# Patient Record
Sex: MALE | Race: WHITE | Employment: FULL TIME | URBAN - METROPOLITAN AREA
[De-identification: names, ages, dates, MRNs, and addresses within clinical notes are randomized per-mention and may not be internally consistent; named-entity substitution may affect disease eponyms.]

---

## 2017-10-30 ENCOUNTER — HOSPITAL ENCOUNTER (EMERGENCY)
Age: 22
Discharge: HOME | End: 2017-10-30
Attending: EMERGENCY MEDICINE
Payer: OTHER MISCELLANEOUS

## 2017-10-30 VITALS
OXYGEN SATURATION: 99 % | RESPIRATION RATE: 16 BRPM | WEIGHT: 200 LBS | HEART RATE: 84 BPM | SYSTOLIC BLOOD PRESSURE: 124 MMHG | DIASTOLIC BLOOD PRESSURE: 70 MMHG | TEMPERATURE: 99 F

## 2017-10-30 DIAGNOSIS — S39.011A ABDOMINAL WALL STRAIN, INITIAL ENCOUNTER: Primary | ICD-10-CM

## 2017-10-30 PROCEDURE — 99282 EMERGENCY DEPT VISIT SF MDM: CPT

## 2017-10-31 NOTE — ED PROVIDER NOTES
Patient Seen in: [facility] Emergency Department In [place]    History   Patient presents with:  Abdomen/Flank Pain (GI/)    Stated Complaint: abd pain possible hernia    HPI    42-year-old male presents for evaluation of a possible hernia.   Patient was display    ============================================================  ED Course  ------------------------------------------------------------  MDM     Patient's abdomen is soft and nontender. There are no palpable hernias. He felt better knowing this.

## 2017-10-31 NOTE — ED INITIAL ASSESSMENT (HPI)
Pt in er for c/o pain to his lower abdomin post lifting an item, pt is concerned he may have a hernia

## 2018-09-19 ENCOUNTER — HOSPITAL ENCOUNTER (EMERGENCY)
Age: 23
Discharge: HOME | End: 2018-09-19
Attending: EMERGENCY MEDICINE
Payer: COMMERCIAL

## 2018-09-19 VITALS
SYSTOLIC BLOOD PRESSURE: 130 MMHG | TEMPERATURE: 98 F | OXYGEN SATURATION: 100 % | WEIGHT: 215 LBS | HEIGHT: 71 IN | RESPIRATION RATE: 18 BRPM | DIASTOLIC BLOOD PRESSURE: 72 MMHG | BODY MASS INDEX: 30.1 KG/M2 | HEART RATE: 74 BPM

## 2018-09-19 DIAGNOSIS — H57.12 PAIN OF LEFT EYE: Primary | ICD-10-CM

## 2018-09-19 PROCEDURE — 99284 EMERGENCY DEPT VISIT MOD MDM: CPT

## 2018-09-19 PROCEDURE — 99283 EMERGENCY DEPT VISIT LOW MDM: CPT

## 2018-09-19 NOTE — ED INITIAL ASSESSMENT (HPI)
Pt states he was walking out of the gym last night and rubbed his eye not sure if got something in it or scratched it but woke up w left eye swelling and redness.

## 2018-09-19 NOTE — ED PROVIDER NOTES
Patient Seen in: [facility] Emergency Department In [place]    History   Patient presents with:  Eyelid Swelling    Stated Complaint: left eye swelling    HPI    20year-old smoker who wears contacts presents for evaluation of left eye redness and pain. intact. Sclera are not icteric. Conjunctivae injected OS. Mucous members are moist.  Left eye was stained with fluorescein no uptake noted. Upper and lower lids were flipped no foreign body appreciated.   No appreciable foreign body on slit-lamp exam. for this patient.

## 2020-08-04 ENCOUNTER — HOSPITAL ENCOUNTER (EMERGENCY)
Age: 25
Discharge: HOME | End: 2020-08-04
Attending: EMERGENCY MEDICINE
Payer: COMMERCIAL

## 2020-08-04 VITALS
HEART RATE: 85 BPM | SYSTOLIC BLOOD PRESSURE: 125 MMHG | RESPIRATION RATE: 20 BRPM | TEMPERATURE: 99 F | DIASTOLIC BLOOD PRESSURE: 74 MMHG | HEIGHT: 69 IN | BODY MASS INDEX: 31.84 KG/M2 | OXYGEN SATURATION: 99 % | WEIGHT: 215 LBS

## 2020-08-04 DIAGNOSIS — H11.32 SUBCONJUNCTIVAL HEMATOMA, LEFT: Primary | ICD-10-CM

## 2020-08-04 PROCEDURE — 99282 EMERGENCY DEPT VISIT SF MDM: CPT

## 2020-08-04 NOTE — ED PROVIDER NOTES
Patient Seen in: [facility] Emergency Department In [place]      History   Patient presents with:   Eye Visual Problem    Stated Complaint: WOKE WITH EYES RED THIS AM    HPI    75-year-old male comes to the hospital complaint of having difficulty with redn patient.

## (undated) NOTE — ED AVS SNAPSHOT
Amber Cabrera   MRN: [id number]    Department:  THE [facility] Emergency Department in [facility]   Date of Visit:  9/19/2018           Disclosure     Insurance plans vary and the physician(s) referred by the ER may not be covered by your plan.  Please contact tell this physician (or your personal doctor if your instructions are to return to your personal doctor) about any new or lasting problems. The primary care or specialist physician will see patients referred from the [facility] Emergency Department.  Scott Diez

## (undated) NOTE — LETTER
Date & Time: 9/19/2018, 10:26 AM  Patient: Mitzy Rosas  Encounter Provider(s):    Linda House MD       To Whom It May Concern:    Hanna Funk was seen and treated in our department on 9/19/2018. He should not return to work until Tomorrow.     I

## (undated) NOTE — LETTER
August 4, 2020    Patient: Risa Lamb   Date of Visit: 8/4/2020       To Whom It May Concern:    Francea Head was seen and treated in our emergency department on 8/4/2020. He can return to work.     If you have any questions or concerns, please don'

## (undated) NOTE — ED AVS SNAPSHOT
Leanne Hernandez   MRN: [id number]    Department:  [facility] Emergency Department in [place]   Date of Visit:  10/30/2017           Disclosure     Insurance plans vary and the physician(s) referred by the ER may not be covered by your plan.  Please contac If you have been prescribed any medication(s), please fill your prescription right away and begin taking the medication(s) as directed    If the emergency physician has read X-rays, these will be re-interpreted by a radiologist.  If there is a significant